# Patient Record
Sex: FEMALE | Race: WHITE | Employment: FULL TIME | URBAN - METROPOLITAN AREA
[De-identification: names, ages, dates, MRNs, and addresses within clinical notes are randomized per-mention and may not be internally consistent; named-entity substitution may affect disease eponyms.]

---

## 2024-06-05 ENCOUNTER — HOSPITAL ENCOUNTER (OUTPATIENT)
Dept: VASCULAR SURGERY | Facility: HOSPITAL | Age: 54
Discharge: HOME | End: 2024-06-07
Payer: COMMERCIAL

## 2024-06-05 DIAGNOSIS — I65.22 OCCLUSION AND STENOSIS OF LEFT CAROTID ARTERY: ICD-10-CM

## 2024-06-05 LAB
VAS LEFT CCA DIST EDV: 26.6 CM/S
VAS LEFT CCA DIST PSV: 90.5 CM/S
VAS LEFT CCA PROX EDV: 24.8 CM/S
VAS LEFT CCA PROX PSV: 158.1 CM/S
VAS LEFT ECA EDV: 30.3 CM/S
VAS LEFT ECA PSV: 106.9 CM/S
VAS LEFT ICA DIST EDV: 31.6 CM/S
VAS LEFT ICA DIST PSV: 95.8 CM/S
VAS LEFT ICA MID EDV: 37.6 CM/S
VAS LEFT ICA MID PSV: 108.7 CM/S
VAS LEFT ICA PROX EDV: 17.5 CM/S
VAS LEFT ICA PROX PSV: 112.4 CM/S
VAS LEFT ICA/CCA PSV: 1.2 NO UNITS
VAS LEFT SUBCLAVIAN PROX EDV: 0 CM/S
VAS LEFT SUBCLAVIAN PROX PSV: 147.2 CM/S
VAS LEFT VERTEBRAL EDV: 15.7 CM/S
VAS LEFT VERTEBRAL PSV: 50.6 CM/S
VAS RIGHT CCA DIST EDV: 29.2 CM/S
VAS RIGHT CCA DIST PSV: 90.7 CM/S
VAS RIGHT CCA PROX EDV: 24.9 CM/S
VAS RIGHT CCA PROX PSV: 125.8 CM/S
VAS RIGHT ECA EDV: 24.8 CM/S
VAS RIGHT ECA PSV: 139.8 CM/S
VAS RIGHT ICA DIST EDV: 43.1 CM/S
VAS RIGHT ICA DIST PSV: 105.1 CM/S
VAS RIGHT ICA MID EDV: 37.6 CM/S
VAS RIGHT ICA MID PSV: 88.7 CM/S
VAS RIGHT ICA PROX EDV: 26.6 CM/S
VAS RIGHT ICA PROX PSV: 105.1 CM/S
VAS RIGHT ICA/CCA PSV: 1.2 NO UNITS
VAS RIGHT SUBCLAVIAN PROX EDV: 6.7 CM/S
VAS RIGHT SUBCLAVIAN PROX PSV: 206.2 CM/S
VAS RIGHT VERTEBRAL EDV: 16 CM/S
VAS RIGHT VERTEBRAL PSV: 57.8 CM/S

## 2024-06-05 PROCEDURE — 93880 EXTRACRANIAL BILAT STUDY: CPT

## 2025-04-29 PROBLEM — I10 ESSENTIAL HYPERTENSION: Status: ACTIVE | Noted: 2025-04-29

## 2025-04-29 PROBLEM — G47.33 OSA (OBSTRUCTIVE SLEEP APNEA): Status: ACTIVE | Noted: 2025-04-29

## 2025-04-29 PROBLEM — I77.9 LEFT-SIDED CAROTID ARTERY DISEASE: Status: ACTIVE | Noted: 2025-04-29

## 2025-04-29 RX ORDER — HYDROCHLOROTHIAZIDE 12.5 MG/1
12.5 CAPSULE ORAL DAILY
COMMUNITY

## 2025-05-02 ENCOUNTER — OFFICE VISIT (OUTPATIENT)
Age: 55
End: 2025-05-02
Payer: COMMERCIAL

## 2025-05-02 VITALS
RESPIRATION RATE: 18 BRPM | WEIGHT: 144 LBS | OXYGEN SATURATION: 98 % | HEART RATE: 84 BPM | SYSTOLIC BLOOD PRESSURE: 120 MMHG | DIASTOLIC BLOOD PRESSURE: 74 MMHG

## 2025-05-02 DIAGNOSIS — I10 ESSENTIAL HYPERTENSION: ICD-10-CM

## 2025-05-02 DIAGNOSIS — M79.604 PAIN IN BOTH LOWER EXTREMITIES: ICD-10-CM

## 2025-05-02 DIAGNOSIS — R94.31 ABNORMAL EKG: Primary | ICD-10-CM

## 2025-05-02 DIAGNOSIS — R07.89 ATYPICAL CHEST PAIN: ICD-10-CM

## 2025-05-02 DIAGNOSIS — M79.605 PAIN IN BOTH LOWER EXTREMITIES: ICD-10-CM

## 2025-05-02 PROCEDURE — 93000 ELECTROCARDIOGRAM COMPLETE: CPT | Performed by: INTERNAL MEDICINE

## 2025-05-02 PROCEDURE — 3074F SYST BP LT 130 MM HG: CPT | Performed by: INTERNAL MEDICINE

## 2025-05-02 PROCEDURE — 99204 OFFICE O/P NEW MOD 45 MIN: CPT | Performed by: INTERNAL MEDICINE

## 2025-05-02 PROCEDURE — 3078F DIAST BP <80 MM HG: CPT | Performed by: INTERNAL MEDICINE

## 2025-05-02 RX ORDER — ERENUMAB-AOOE 140 MG/ML
140 INJECTION, SOLUTION SUBCUTANEOUS
COMMUNITY

## 2025-05-02 RX ORDER — BUPROPION HYDROCHLORIDE 150 MG/1
150 TABLET, EXTENDED RELEASE ORAL
COMMUNITY
Start: 2024-12-31

## 2025-05-02 RX ORDER — GABAPENTIN 300 MG/1
300 CAPSULE ORAL
COMMUNITY
Start: 2024-12-26

## 2025-05-02 RX ORDER — ATENOLOL 25 MG/1
TABLET ORAL
Qty: 2 TABLET | Refills: 0 | Status: SHIPPED | OUTPATIENT
Start: 2025-05-02 | End: 2025-05-02 | Stop reason: SDUPTHER

## 2025-05-02 RX ORDER — SUMATRIPTAN SUCCINATE 25 MG/1
25 TABLET ORAL
COMMUNITY
Start: 2023-10-16

## 2025-05-02 RX ORDER — ATENOLOL 25 MG/1
TABLET ORAL
Qty: 2 TABLET | Refills: 0 | Status: SHIPPED | OUTPATIENT
Start: 2025-05-02

## 2025-05-02 NOTE — PROGRESS NOTES
Chief Complaint   Patient presents with    New Patient    Hypertension    Referral - General         Chest Pain  No        Last Lipids No results found for: \"CHOL\", \"TRIG\", \"HDL\", \"VLDL\", \"CHOLHDLRATIO\"     Refills    /74 (BP Site: Left Upper Arm, Patient Position: Sitting, BP Cuff Size: Medium Adult)   Pulse 84   Resp 18   Wt 65.3 kg (144 lb)   SpO2 98%       Have you been to the ER, urgent care clinic since your last visit? No  Hospitalized since your last visit? NO    2. Have you seen or consulted with any other health care providers outside of the [facility] since your last visit?  No

## 2025-05-02 NOTE — PROGRESS NOTES
Lee Polo MD., [facility]    [id number],[facility],[place] [address]    Office [phone],Fax [phone]           Diane Greene is a 54 y.o. female referred for evaluation of hypertension/atypical chest tightness. Consult requested by Abdoulaye Tucker MD    CC - as documented in EMR    Dear Abdoulaye Caldera MD    I had the pleasure of seeing Ms..Mary Greene in the office today.      Assessment:     Hypertension-intolerant to losartan.  Has been on beta-blockers previously.  Currently on hydrochlorothiazide.  Atypical chest tightness  MUSTAPHA-not using CPAP  Anxiety  Abnormal EKG  Cramping sensation lower extremities        Plan:          Home blood pressures controlled on hydrochlorothiazide.  Continue.  1/3/25 , , HDL 58,    Will get BMP checked by PCP.  Coronary CTA.  Echocardiogram.  ALONSO.  Follow-up 1 year/earlier based on cardiac workup.  Reviewed PCP office note 3/4/25-hypertension (intolerant to losartan, dose of hydrochlorothiazide increased), MUSTAPHA-not using CPAP, anxiety, history of migraine-    Patient understands the plan. All questions were answered to the patient's satisfaction.    I appreciate the opportunity to be involved in care. See note below for details. Please do not hesitate to contact us   with questions or concerns.    Lee Polo MD      Cardiac Testing/ Procedures:       A.Cardiac Cath/PCI:    B.ECHO/HENRY:    C.StressNuclear/Stress ECHO/Stress test:    D.Vascular:    E. EP:    F. Miscellaneous:    History:     Diane Greene is a 54 y.o. female who is referred for evaluation of hypertension, atypical chest tightness, dyspnea on exertion.  She also complains of bilateral lower extremity cramping sensation and \"legs feeling cold to an extent of having to wear socks at night\".  No prior history of CAD.  Family history of CAD.      Past Medical/Surgical and Family/Social History:     History reviewed. No pertinent past

## 2025-06-05 ENCOUNTER — TRANSCRIBE ORDERS (OUTPATIENT)
Facility: HOSPITAL | Age: 55
End: 2025-06-05

## 2025-06-05 DIAGNOSIS — N95.9 UNSPECIFIED MENOPAUSAL AND PERIMENOPAUSAL DISORDER: Primary | ICD-10-CM

## 2025-06-30 ENCOUNTER — RESULTS FOLLOW-UP (OUTPATIENT)
Age: 55
End: 2025-06-30

## 2025-07-07 NOTE — PRE-PROCEDURE INSTRUCTIONS
7/7/2025  2:20 p.m.  Left message for patient to return call in order to confirm arrival time and give instructions for appointment scheduled on 7/9/2025.    7/7/2025  2:24 p.m.  Spoke with patient and confirmed arrival time of 8:15 a.m. for appointment scheduled on 7/9/2025; instructions given.

## 2025-07-09 ENCOUNTER — HOSPITAL ENCOUNTER (OUTPATIENT)
Facility: HOSPITAL | Age: 55
Discharge: HOME | End: 2025-07-12
Attending: FAMILY MEDICINE
Payer: COMMERCIAL

## 2025-07-09 DIAGNOSIS — N95.9 UNSPECIFIED MENOPAUSAL AND PERIMENOPAUSAL DISORDER: ICD-10-CM

## 2025-07-09 PROCEDURE — 77080 DXA BONE DENSITY AXIAL: CPT

## 2025-07-15 ENCOUNTER — HOSPITAL ENCOUNTER (OUTPATIENT)
Facility: HOSPITAL | Age: 55
Discharge: HOME | End: 2025-07-18
Attending: INTERNAL MEDICINE
Payer: COMMERCIAL

## 2025-07-15 VITALS — HEART RATE: 63 BPM | OXYGEN SATURATION: 98 % | DIASTOLIC BLOOD PRESSURE: 69 MMHG | SYSTOLIC BLOOD PRESSURE: 124 MMHG

## 2025-07-15 DIAGNOSIS — I10 ESSENTIAL HYPERTENSION: ICD-10-CM

## 2025-07-15 DIAGNOSIS — R07.89 ATYPICAL CHEST PAIN: ICD-10-CM

## 2025-07-15 DIAGNOSIS — R94.31 ABNORMAL EKG: ICD-10-CM

## 2025-07-15 DIAGNOSIS — M79.605 PAIN IN BOTH LOWER EXTREMITIES: ICD-10-CM

## 2025-07-15 DIAGNOSIS — M79.604 PAIN IN BOTH LOWER EXTREMITIES: ICD-10-CM

## 2025-07-15 PROCEDURE — 75574 CT ANGIO HRT W/3D IMAGE: CPT

## 2025-07-15 PROCEDURE — 6370000000 HC RX 637 (ALT 250 FOR IP): Performed by: INTERNAL MEDICINE

## 2025-07-15 PROCEDURE — 75574 CT ANGIO HRT W/3D IMAGE: CPT | Performed by: INTERNAL MEDICINE

## 2025-07-15 PROCEDURE — 6360000004 HC RX CONTRAST MEDICATION: Performed by: RADIOLOGY

## 2025-07-15 RX ORDER — IOPAMIDOL 755 MG/ML
100 INJECTION, SOLUTION INTRAVASCULAR
Status: COMPLETED | OUTPATIENT
Start: 2025-07-15 | End: 2025-07-15

## 2025-07-15 RX ORDER — NITROGLYCERIN 0.4 MG/1
0.8 TABLET SUBLINGUAL ONCE
Status: COMPLETED | OUTPATIENT
Start: 2025-07-15 | End: 2025-07-15

## 2025-07-15 RX ADMIN — IOPAMIDOL 80 ML: 755 INJECTION, SOLUTION INTRAVENOUS at 09:40

## 2025-07-15 RX ADMIN — NITROGLYCERIN 0.8 MG: 0.4 TABLET SUBLINGUAL at 09:24

## 2025-07-15 NOTE — PROGRESS NOTES
0900  Patient brought to [facility] from waiting room for Coronary CT. Patient verified and pre-med taken and allergies confirmed. VS taken and stable. 20 G PIV placed in Left AC. iStat collected and CT staff aware patient is ready for exam. Nirtoglycerin 0.8 mg SL given prior to CT. Patient ambulated to CT. Patient connected ECG and contrast injector.   0945  VS taken post procedure and stable. PIV removed. Patient denies pain or dizziness. Encouraged drink water and check for headache or dizziness. Patient ambulated to lobby to discharge.